# Patient Record
Sex: FEMALE | Race: WHITE
[De-identification: names, ages, dates, MRNs, and addresses within clinical notes are randomized per-mention and may not be internally consistent; named-entity substitution may affect disease eponyms.]

---

## 2021-12-13 ENCOUNTER — HOSPITAL ENCOUNTER (EMERGENCY)
Dept: HOSPITAL 26 - MED | Age: 31
LOS: 1 days | Discharge: HOME | End: 2021-12-14
Payer: MEDICAID

## 2021-12-13 VITALS — BODY MASS INDEX: 29.44 KG/M2 | HEIGHT: 62 IN | WEIGHT: 160 LBS

## 2021-12-13 VITALS — DIASTOLIC BLOOD PRESSURE: 93 MMHG | SYSTOLIC BLOOD PRESSURE: 138 MMHG

## 2021-12-13 DIAGNOSIS — O99.353: Primary | ICD-10-CM

## 2021-12-13 DIAGNOSIS — Z3A.34: ICD-10-CM

## 2021-12-13 DIAGNOSIS — R06.02: ICD-10-CM

## 2021-12-13 DIAGNOSIS — O99.513: ICD-10-CM

## 2021-12-13 DIAGNOSIS — R55: ICD-10-CM

## 2021-12-13 LAB
ALBUMIN FLD-MCNC: 2.6 G/DL (ref 3.4–5)
ANION GAP SERPL CALCULATED.3IONS-SCNC: 12.7 MMOL/L (ref 8–16)
AST SERPL-CCNC: 34 U/L (ref 15–37)
BASOPHILS # BLD AUTO: 0 K/UL (ref 0–0.22)
BASOPHILS NFR BLD AUTO: 0.5 % (ref 0–2)
BILIRUB SERPL-MCNC: 0.2 MG/DL (ref 0–1)
BUN SERPL-MCNC: 7 MG/DL (ref 7–18)
CHLORIDE SERPL-SCNC: 105 MMOL/L (ref 98–107)
CO2 SERPL-SCNC: 23.7 MMOL/L (ref 21–32)
CREAT SERPL-MCNC: 0.6 MG/DL (ref 0.6–1.3)
EOSINOPHIL # BLD AUTO: 0.1 K/UL (ref 0–0.4)
EOSINOPHIL NFR BLD AUTO: 0.9 % (ref 0–4)
ERYTHROCYTE [DISTWIDTH] IN BLOOD BY AUTOMATED COUNT: 13.8 % (ref 11.6–13.7)
GFR SERPL CREATININE-BSD FRML MDRD: 152 ML/MIN (ref 90–?)
GLUCOSE SERPL-MCNC: 86 MG/DL (ref 74–106)
HCT VFR BLD AUTO: 38.6 % (ref 36–48)
HGB BLD-MCNC: 13.1 G/DL (ref 12–16)
LYMPHOCYTES # BLD AUTO: 2.7 K/UL (ref 2.5–16.5)
LYMPHOCYTES NFR BLD AUTO: 36.1 % (ref 20.5–51.1)
MCH RBC QN AUTO: 30 PG (ref 27–31)
MCHC RBC AUTO-ENTMCNC: 34 G/DL (ref 33–37)
MCV RBC AUTO: 88.1 FL (ref 80–94)
MONOCYTES # BLD AUTO: 0.6 K/UL (ref 0.8–1)
MONOCYTES NFR BLD AUTO: 8.1 % (ref 1.7–9.3)
NEUTROPHILS # BLD AUTO: 4.1 K/UL (ref 1.8–7.7)
NEUTROPHILS NFR BLD AUTO: 54.4 % (ref 42.2–75.2)
PLATELET # BLD AUTO: 221 K/UL (ref 140–450)
POTASSIUM SERPL-SCNC: 3.4 MMOL/L (ref 3.5–5.1)
RBC # BLD AUTO: 4.38 MIL/UL (ref 4.2–5.4)
SODIUM SERPL-SCNC: 138 MMOL/L (ref 136–145)
WBC # BLD AUTO: 7.5 K/UL (ref 4.8–10.8)

## 2021-12-13 PROCEDURE — 36415 COLL VENOUS BLD VENIPUNCTURE: CPT

## 2021-12-13 PROCEDURE — 85025 COMPLETE CBC W/AUTO DIFF WBC: CPT

## 2021-12-13 PROCEDURE — 96360 HYDRATION IV INFUSION INIT: CPT

## 2021-12-13 PROCEDURE — 81003 URINALYSIS AUTO W/O SCOPE: CPT

## 2021-12-13 PROCEDURE — 71275 CT ANGIOGRAPHY CHEST: CPT

## 2021-12-13 PROCEDURE — 93005 ELECTROCARDIOGRAM TRACING: CPT

## 2021-12-13 PROCEDURE — 99285 EMERGENCY DEPT VISIT HI MDM: CPT

## 2021-12-13 PROCEDURE — 80053 COMPREHEN METABOLIC PANEL: CPT

## 2021-12-13 PROCEDURE — 96361 HYDRATE IV INFUSION ADD-ON: CPT

## 2021-12-13 NOTE — NUR
doppler used for fetal heart tones rate of 130bpm on the right upper quadrant. 
ERMD at bedside to hear

## 2021-12-14 VITALS — DIASTOLIC BLOOD PRESSURE: 83 MMHG | SYSTOLIC BLOOD PRESSURE: 137 MMHG

## 2021-12-14 LAB
APPEARANCE UR: CLEAR
BILIRUB UR QL STRIP: NEGATIVE
COLOR UR: YELLOW
GLUCOSE UR STRIP-MCNC: NEGATIVE MG/DL
HGB UR QL STRIP: NEGATIVE
LEUKOCYTE ESTERASE UR QL STRIP: NEGATIVE
NITRITE UR QL STRIP: NEGATIVE
PH UR STRIP: 7 [PH] (ref 5–9)

## 2021-12-14 NOTE — NUR
IV removed, catheter intact and site benign.  Applied folded 4x4 gauze and tape 
to stop bleeding at left AC and right wrist.